# Patient Record
Sex: MALE | Race: WHITE
[De-identification: names, ages, dates, MRNs, and addresses within clinical notes are randomized per-mention and may not be internally consistent; named-entity substitution may affect disease eponyms.]

---

## 2020-01-14 ENCOUNTER — HOSPITAL ENCOUNTER (OUTPATIENT)
Dept: HOSPITAL 53 - M PLAIMG | Age: 60
End: 2020-01-14
Attending: NURSE PRACTITIONER
Payer: COMMERCIAL

## 2020-01-14 DIAGNOSIS — N20.0: Primary | ICD-10-CM

## 2020-01-14 LAB
BUN SERPL-MCNC: 27 MG/DL (ref 7–18)
CALCIUM SERPL-MCNC: 8.7 MG/DL (ref 8.8–10.2)
CHLORIDE SERPL-SCNC: 112 MEQ/L (ref 98–107)
CO2 SERPL-SCNC: 21 MEQ/L (ref 21–32)
CREAT SERPL-MCNC: 2.73 MG/DL (ref 0.7–1.3)
GFR SERPL CREATININE-BSD FRML MDRD: 25.5 ML/MIN/{1.73_M2} (ref 49–?)
GLUCOSE SERPL-MCNC: 82 MG/DL (ref 70–100)
POTASSIUM SERPL-SCNC: 4.4 MEQ/L (ref 3.5–5.1)
SODIUM SERPL-SCNC: 141 MEQ/L (ref 136–145)

## 2020-01-15 NOTE — REPPI
Clinical:  Kidney stones.

 

Technique:  Two supine views of the abdomen and pelvis.

 

Findings:

No obvious urinary tract calcifications are identified.  The bowel gas pattern is

nonspecific.  No organomegaly.  Skeletal structures demonstrate age-related

degenerative changes.

 

Impression:

No obvious urinary tract calcifications identified.

 

 

Electronically Signed by

Srini Wheeler MD 01/15/2020 04:09 A